# Patient Record
Sex: FEMALE | Race: WHITE | NOT HISPANIC OR LATINO | Employment: UNEMPLOYED | URBAN - METROPOLITAN AREA
[De-identification: names, ages, dates, MRNs, and addresses within clinical notes are randomized per-mention and may not be internally consistent; named-entity substitution may affect disease eponyms.]

---

## 2017-01-31 ENCOUNTER — TELEPHONE (OUTPATIENT)
Dept: VASCULAR LAB | Facility: MEDICAL CENTER | Age: 42
End: 2017-01-31

## 2017-01-31 DIAGNOSIS — Z86.718 HISTORY OF DVT (DEEP VEIN THROMBOSIS): ICD-10-CM

## 2017-01-31 RX ORDER — WARFARIN SODIUM 7.5 MG/1
TABLET ORAL
Qty: 45 TAB | Refills: 0 | Status: SHIPPED | OUTPATIENT
Start: 2017-01-31 | End: 2019-12-11

## 2017-02-07 ENCOUNTER — ANTICOAGULATION MONITORING (OUTPATIENT)
Dept: VASCULAR LAB | Facility: MEDICAL CENTER | Age: 42
End: 2017-02-07

## 2017-02-07 DIAGNOSIS — I82.401 ACUTE DEEP VEIN THROMBOSIS (DVT) OF RIGHT LOWER EXTREMITY, UNSPECIFIED VEIN (HCC): ICD-10-CM

## 2017-02-08 NOTE — PROGRESS NOTES
Discharged from [facility].  Secondary to non/adherence/ non compliance 8-2-2016  Jennifer Mcintosh, LESAD

## 2019-12-11 ENCOUNTER — APPOINTMENT (OUTPATIENT)
Dept: RADIOLOGY | Facility: MEDICAL CENTER | Age: 44
End: 2019-12-11
Attending: EMERGENCY MEDICINE
Payer: COMMERCIAL

## 2019-12-11 ENCOUNTER — HOSPITAL ENCOUNTER (EMERGENCY)
Facility: MEDICAL CENTER | Age: 44
End: 2019-12-11
Attending: EMERGENCY MEDICINE
Payer: COMMERCIAL

## 2019-12-11 VITALS
DIASTOLIC BLOOD PRESSURE: 85 MMHG | HEIGHT: 66 IN | HEART RATE: 91 BPM | WEIGHT: 127.65 LBS | TEMPERATURE: 98.3 F | RESPIRATION RATE: 18 BRPM | OXYGEN SATURATION: 100 % | BODY MASS INDEX: 20.51 KG/M2 | SYSTOLIC BLOOD PRESSURE: 136 MMHG

## 2019-12-11 DIAGNOSIS — R56.9 SEIZURE-LIKE ACTIVITY (HCC): ICD-10-CM

## 2019-12-11 LAB
BASOPHILS # BLD AUTO: 0.5 % (ref 0–1.8)
BASOPHILS # BLD: 0.04 K/UL (ref 0–0.12)
EOSINOPHIL # BLD AUTO: 0.04 K/UL (ref 0–0.51)
EOSINOPHIL NFR BLD: 0.5 % (ref 0–6.9)
ERYTHROCYTE [DISTWIDTH] IN BLOOD BY AUTOMATED COUNT: 45.9 FL (ref 35.9–50)
HCT VFR BLD AUTO: 41.2 % (ref 37–47)
HGB BLD-MCNC: 13.7 G/DL (ref 12–16)
IMM GRANULOCYTES # BLD AUTO: 0.03 K/UL (ref 0–0.11)
IMM GRANULOCYTES NFR BLD AUTO: 0.4 % (ref 0–0.9)
LYMPHOCYTES # BLD AUTO: 2.11 K/UL (ref 1–4.8)
LYMPHOCYTES NFR BLD: 26.2 % (ref 22–41)
MCH RBC QN AUTO: 34.4 PG (ref 27–33)
MCHC RBC AUTO-ENTMCNC: 33.3 G/DL (ref 33.6–35)
MCV RBC AUTO: 103.5 FL (ref 81.4–97.8)
MONOCYTES # BLD AUTO: 0.4 K/UL (ref 0–0.85)
MONOCYTES NFR BLD AUTO: 5 % (ref 0–13.4)
NEUTROPHILS # BLD AUTO: 5.42 K/UL (ref 2–7.15)
NEUTROPHILS NFR BLD: 67.4 % (ref 44–72)
NRBC # BLD AUTO: 0 K/UL
NRBC BLD-RTO: 0 /100 WBC
PLATELET # BLD AUTO: 165 K/UL (ref 164–446)
PMV BLD AUTO: 10.1 FL (ref 9–12.9)
RBC # BLD AUTO: 3.98 M/UL (ref 4.2–5.4)
WBC # BLD AUTO: 8 K/UL (ref 4.8–10.8)

## 2019-12-11 PROCEDURE — 99283 EMERGENCY DEPT VISIT LOW MDM: CPT

## 2019-12-11 PROCEDURE — 85025 COMPLETE CBC W/AUTO DIFF WBC: CPT

## 2019-12-11 PROCEDURE — 70450 CT HEAD/BRAIN W/O DYE: CPT

## 2019-12-11 PROCEDURE — 36415 COLL VENOUS BLD VENIPUNCTURE: CPT

## 2019-12-11 RX ORDER — LEVETIRACETAM 500 MG/1
500 TABLET ORAL 2 TIMES DAILY
Qty: 60 TAB | Refills: 1 | Status: SHIPPED | OUTPATIENT
Start: 2019-12-11 | End: 2021-10-18

## 2019-12-11 RX ORDER — ACETAMINOPHEN 325 MG/1
650 TABLET ORAL EVERY 4 HOURS PRN
COMMUNITY

## 2019-12-11 RX ORDER — IBUPROFEN 200 MG
800 TABLET ORAL EVERY 6 HOURS PRN
COMMUNITY

## 2019-12-11 NOTE — ED NOTES
She went out to her car while she was at work and doesn't remember what happened next. She woke up and vomited.

## 2019-12-11 NOTE — ED NOTES
Med rec updated and complete  Allergies reviewed  Interviewed pt with son at bedside with permission from pt.  Pt reports no prescription medications.  Pt reports no antibiotics in the last 2 weeks

## 2019-12-11 NOTE — ED NOTES
Lab called stating yellow and green tube hemolyzed.  Pt refusing another stick for a lab draw and IV will not draw as patient blood is thick, per patient she should take an anticoagulation medication however does not.  ERP updated.

## 2019-12-11 NOTE — ED PROVIDER NOTES
ED Provider Note    CHIEF COMPLAINT  Chief Complaint   Patient presents with   • Seizure     went out to car at work and can't remember what happenned  She vomited  Had another episode like this in the past       HPI  Kimmy Remy is a 44 y.o. female who presents for evaluation of potential seizure activity.  The patient reports that she was on her break at work when out to her car to smoke a cigarette.  She was apparently woke up in her car seat..  No clear tonic-clonic seizure activity was witnessed.  She did not bite her tongue but she did have some dried emesis around her mouth.  She reports some mild headache.  She reports apparently a history of potential seizure a year ago and was seen at [place] for this.  She does not drink alcohol.  No use of benzodiazepines.  No focal numbness tingling weakness to the arms legs or face.    REVIEW OF SYSTEMS  See HPI for further details.  No numbness tingling weakness fevers or chills all other systems are negative.     PAST MEDICAL HISTORY  Past Medical History:   Diagnosis Date   • Back pain     lower back pain   • Fibromyalgia    • Indigestion     occasional   • Infectious disease    • Other specified symptom associated with female genital organs     tubal ligation   • Personal history of venous thrombosis and embolism    • Renal disorder     acute after bladder infection 2011   • Unspecified hemorrhagic conditions     after birth and tubes ligated to fix       FAMILY HISTORY  Noncontributory    SOCIAL HISTORY  Social History     Socioeconomic History   • Marital status: Single     Spouse name: Not on file   • Number of children: Not on file   • Years of education: Not on file   • Highest education level: Not on file   Occupational History   • Not on file   Social Needs   • Financial resource strain: Not on file   • Food insecurity:     Worry: Not on file     Inability: Not on file   • Transportation needs:     Medical: Not on file     Non-medical: Not on  "file   Tobacco Use   • Smoking status: Current Some Day Smoker     Packs/day: 0.50     Years: 1.00     Pack years: 0.50     Types: Cigarettes     Start date: 11/12/2011   • Smokeless tobacco: Never Used   Substance and Sexual Activity   • Alcohol use: Yes     Comment: occassionally   • Drug use: No   • Sexual activity: Not on file   Lifestyle   • Physical activity:     Days per week: Not on file     Minutes per session: Not on file   • Stress: Not on file   Relationships   • Social connections:     Talks on phone: Not on file     Gets together: Not on file     Attends Jehov[employer]'s witness service: Not on file     Active member of club or organization: Not on file     Attends meetings of clubs or organizations: Not on file     Relationship status: Not on file   • Intimate partner violence:     Fear of current or ex partner: Not on file     Emotionally abused: Not on file     Physically abused: Not on file     Forced sexual activity: Not on file   Other Topics Concern   • Not on file   Social History Narrative   • Not on file       SURGICAL HISTORY  Past Surgical History:   Procedure Laterality Date   • GYN SURGERY  tubligation       CURRENT MEDICATIONS  Home Medications     Reviewed by Erik Nascimento (Pharmacy Tech) on 12/11/19 at 1249  Med List Status: Complete   Medication Last Dose Status   acetaminophen (TYLENOL) 325 MG Tab 12/11/2019 Active   ibuprofen (MOTRIN) 200 MG Tab 12/11/2019 Active   Prenatal Vit-Fe Fumarate-FA (PRENATAL PO) > 2 nights Active                ALLERGIES  Allergies   Allergen Reactions   • Cymbalta [Duloxetine Hcl]      seizure   • Keflex Rash       PHYSICAL EXAM  VITAL SIGNS: /83   Pulse 91   Temp 36.8 °C (98.3 °F) (Temporal)   Resp 16   Ht 1.676 m (5' 6\")   Wt 57.9 kg (127 lb 10.3 oz)   LMP 11/13/2019   SpO2 (!) 85%   BMI 20.60 kg/m²  Room air O2: 98    Constitutional: Well developed, Well nourished, No acute distress, Non-toxic appearance.   HENT: Normocephalic, Atraumatic, " Bilateral external ears normal, Oropharynx moist, No oral exudates, Nose normal.   Eyes: PERRLA, EOMI, Conjunctiva normal, No discharge.   Neck: Normal range of motion, No tenderness, Supple, No stridor.   Cardiovascular: Normal heart rate, Normal rhythm, No murmurs, No rubs, No gallops.   Thorax & Lungs: Normal breath sounds, No respiratory distress, No wheezing, No chest tenderness.   Abdomen: Bowel sounds normal, Soft, No tenderness, No masses, No pulsatile masses.   Skin: Warm, Dry, No erythema, No rash.   Back: No tenderness, No CVA tenderness.   Extremities: Intact distal pulses, No edema, No tenderness, No cyanosis, No clubbing.   Neurologic: Alert & oriented x 3, Normal motor function, Normal sensory function, No focal deficits noted.   Psychiatric: Affect normal, Judgment normal, Mood normal.     CT-HEAD W/O   Final Result      No CT evidence of acute infarct, hemorrhage or mass.        Results for orders placed or performed during the hospital encounter of 12/11/19   CBC WITH DIFFERENTIAL   Result Value Ref Range    WBC 8.0 4.8 - 10.8 K/uL    RBC 3.98 (L) 4.20 - 5.40 M/uL    Hemoglobin 13.7 12.0 - 16.0 g/dL    Hematocrit 41.2 37.0 - 47.0 %    .5 (H) 81.4 - 97.8 fL    MCH 34.4 (H) 27.0 - 33.0 pg    MCHC 33.3 (L) 33.6 - 35.0 g/dL    RDW 45.9 35.9 - 50.0 fL    Platelet Count 165 164 - 446 K/uL    MPV 10.1 9.0 - 12.9 fL    Neutrophils-Polys 67.40 44.00 - 72.00 %    Lymphocytes 26.20 22.00 - 41.00 %    Monocytes 5.00 0.00 - 13.40 %    Eosinophils 0.50 0.00 - 6.90 %    Basophils 0.50 0.00 - 1.80 %    Immature Granulocytes 0.40 0.00 - 0.90 %    Nucleated RBC 0.00 /100 WBC    Neutrophils (Absolute) 5.42 2.00 - 7.15 K/uL    Lymphs (Absolute) 2.11 1.00 - 4.80 K/uL    Monos (Absolute) 0.40 0.00 - 0.85 K/uL    Eos (Absolute) 0.04 0.00 - 0.51 K/uL    Baso (Absolute) 0.04 0.00 - 0.12 K/uL    Immature Granulocytes (abs) 0.03 0.00 - 0.11 K/uL    NRBC (Absolute) 0.00 K/uL      COURSE & MEDICAL DECISION  MAKING  Patient presents here with what appears to be possible seizure-like activity CT scan of the head is negative.  Patient apparently had her metabolic panel and prolactin levels clotted and she refused repeat blood draw.  Blood sugar was normal.  I counseled the patient that this could in fact be seizure.  I will start her on Keppra.  She has a PCP follow-up in 2 weeks.    FINAL IMPRESSION  1.  Possible seizure-like activity         Electronically signed by: Vik García, 12/11/2019 1:14 PM

## 2019-12-11 NOTE — ED NOTES
ERP at bedside. Pt agrees with plan of care discussed by ERP. AIDET acknowledged with patient. Demarco in low position, side rail up for pt safety. Call light within reach. Will continue to monitor.

## 2019-12-11 NOTE — ED NOTES
Discharge instructions provided.  Pt verbalized the understanding of discharge instructions to follow up with PCP and to return to ER if condition worsens.  Pt ambulated out of ER without difficulty.   RX 1       Eduction on Protein C deficiency, DVT and Keppra, Krames educational handouts provided.

## 2021-08-04 ENCOUNTER — TELEPHONE (OUTPATIENT)
Dept: SCHEDULING | Facility: IMAGING CENTER | Age: 46
End: 2021-08-04

## 2021-08-04 ENCOUNTER — PATIENT OUTREACH (OUTPATIENT)
Dept: SCHEDULING | Facility: IMAGING CENTER | Age: 46
End: 2021-08-04

## 2021-09-02 ENCOUNTER — OFFICE VISIT (OUTPATIENT)
Dept: MEDICAL GROUP | Facility: MEDICAL CENTER | Age: 46
End: 2021-09-02
Payer: COMMERCIAL

## 2021-09-02 VITALS
SYSTOLIC BLOOD PRESSURE: 110 MMHG | BODY MASS INDEX: 17.43 KG/M2 | HEART RATE: 79 BPM | RESPIRATION RATE: 18 BRPM | DIASTOLIC BLOOD PRESSURE: 62 MMHG | HEIGHT: 66 IN | OXYGEN SATURATION: 99 % | WEIGHT: 108.47 LBS | TEMPERATURE: 97.4 F

## 2021-09-02 DIAGNOSIS — Z72.0 TOBACCO USE: ICD-10-CM

## 2021-09-02 DIAGNOSIS — R63.4 WEIGHT LOSS: ICD-10-CM

## 2021-09-02 DIAGNOSIS — F41.9 ANXIETY AND DEPRESSION: ICD-10-CM

## 2021-09-02 DIAGNOSIS — M79.7 FIBROMYALGIA: ICD-10-CM

## 2021-09-02 DIAGNOSIS — I82.629 DEEP VEIN THROMBOSIS (DVT) OF OTHER VEIN OF UPPER EXTREMITY, UNSPECIFIED CHRONICITY, UNSPECIFIED LATERALITY (HCC): ICD-10-CM

## 2021-09-02 DIAGNOSIS — Z76.89 ENCOUNTER TO ESTABLISH CARE: ICD-10-CM

## 2021-09-02 DIAGNOSIS — G40.909 SEIZURE DISORDER (HCC): ICD-10-CM

## 2021-09-02 DIAGNOSIS — F32.A ANXIETY AND DEPRESSION: ICD-10-CM

## 2021-09-02 PROCEDURE — 99204 OFFICE O/P NEW MOD 45 MIN: CPT | Performed by: NURSE PRACTITIONER

## 2021-09-02 RX ORDER — APIXABAN 2.5 MG/1
TABLET, FILM COATED ORAL
COMMUNITY
Start: 2021-07-25 | End: 2021-12-15 | Stop reason: SDUPTHER

## 2021-09-02 RX ORDER — ZONISAMIDE 100 MG/1
CAPSULE ORAL
COMMUNITY
Start: 2021-07-25

## 2021-09-02 ASSESSMENT — PATIENT HEALTH QUESTIONNAIRE - PHQ9: CLINICAL INTERPRETATION OF PHQ2 SCORE: 0

## 2021-09-02 NOTE — ASSESSMENT & PLAN NOTE
She developed a DVT in the neck and extending down the arm on the left side after hospitalization with sepsis several years ago.  She is remained on Eliquis and is tolerating this without difficulty  unclear if she had a full work-up  Ultrasound:  Evidence of deep vein thrombosis extending from the left internal jugular vein through the subclavian vein, axillary vein and into the basilic vein.    Component      Latest Ref Rng & Units 6/9/2012 6/9/2012 6/9/2012 6/9/2012           3:30 PM  3:30 PM  3:30 PM  3:30 PM   Anti-Cariolipin Ab Igg      0 - 14 GPL   10    Anti-Cardiolipin Ab Igm      0 - 12 MPL   3    Anti-Cardiolipin Ab Iga      0 - 11 APL   2    Antithrombin III, Functional      76 - 128 % 110      Antithrombin III Ag Immuno      82 - 136 % 121      Protein S-Functional      57 - 131 %    94   Protein C Functional      77 - 173 %       V Leiden Factor             Homocysteine      <=10 umol/L  10       Component      Latest Ref Rng & Units 6/9/2012 6/9/2012           3:30 PM  3:30 PM   Anti-Cariolipin Ab Igg      0 - 14 GPL     Anti-Cardiolipin Ab Igm      0 - 12 MPL     Anti-Cardiolipin Ab Iga      0 - 11 APL     Antithrombin III, Functional      76 - 128 %     Antithrombin III Ag Immuno      82 - 136 %     Protein S-Functional      57 - 131 %     Protein C Functional      77 - 173 % 66 (L)    V Leiden Factor        Negative   Homocysteine      <=10 umol/L

## 2021-09-02 NOTE — ASSESSMENT & PLAN NOTE
She reports history of persistent anxiety as well as mild depression.  States that she constantly feels stressed.  She is not a relationship and not necessarily happy with this but denies any abuse.  She is not currently on any treatment for anxiety and depression, possible that this is contributing to her lack of appetite and weight loss

## 2021-09-02 NOTE — PROGRESS NOTES
Subjective:     Chief Complaint   Patient presents with   • Establish Care     weight loss     Kimmy Remy is a 46 y.o. female here to establish care.  She is currently working from home, lives with her youngest son and boyfriend.  Previously seen through [facility], did not feel that she was getting adequate care there.  She has several concerns today, we will need to bring her back for follow-up soon    Weight loss  Pt reports weight loss of about 10 pounds in the last year.  At one point her weight had been up to 190 several years ago.  She states that she left her  around that time and had a major change and lifestyle and diet and began to progressively lose weight.  At this point she is concerned that her weight is unhealthy.  She states that she is hungry but food does not taste good to her and she very rarely is able to finish her meal, sometimes skips meals.  She does have anxiety which is not currently treated.  States that she often feels stressed.  She uses marijuana daily  Denies any persistent reflux, nausea, or abdominal pain associated with eating.  No night sweats.  Last mammogram about a year ago, she is not yet had a colon cancer screening.  Denies changes in bowel movements, blood in stool.  Maternal grandmother had uterine cancer otherwise no family history that she is aware of  Reports rare alcohol use, denies any other illicit drugs    Tobacco use  Started in 2011, states that she has been smoking about 5 cigarettes or so daily.  Denies any shortness of breath, chronic cough    Seizure disorder (HCC)  Chronic issue followed by neurology, stable at this time on zonegran    Anxiety and depression  She reports history of persistent anxiety as well as mild depression.  States that she constantly feels stressed.  She is not a relationship and not necessarily happy with this but denies any abuse.  She is not currently on any treatment for anxiety and depression, possible that this is  contributing to her lack of appetite and weight loss    Fibromyalgia  Reports prior diagnosis of fibromyalgia, managing with Tylenol and ibuprofen as needed.  She was given trial of Cymbalta but this was around the time that she developed seizures    DVT (deep venous thrombosis)  She developed a DVT in the neck and extending down the arm on the left side after hospitalization with sepsis several years ago.  She is remained on Eliquis and is tolerating this without difficulty  unclear if she had a full work-up  Ultrasound:  Evidence of deep vein thrombosis extending from the left internal jugular vein through the subclavian vein, axillary vein and into the basilic vein.    Component      Latest Ref Rng & Units 6/9/2012 6/9/2012 6/9/2012 6/9/2012           3:30 PM  3:30 PM  3:30 PM  3:30 PM   Anti-Cariolipin Ab Igg      0 - 14 GPL   10    Anti-Cardiolipin Ab Igm      0 - 12 MPL   3    Anti-Cardiolipin Ab Iga      0 - 11 APL   2    Antithrombin III, Functional      76 - 128 % 110      Antithrombin III Ag Immuno      82 - 136 % 121      Protein S-Functional      57 - 131 %    94   Protein C Functional      77 - 173 %       V Leiden Factor             Homocysteine      <=10 umol/L  10       Component      Latest Ref Rng & Units 6/9/2012 6/9/2012           3:30 PM  3:30 PM   Anti-Cariolipin Ab Igg      0 - 14 GPL     Anti-Cardiolipin Ab Igm      0 - 12 MPL     Anti-Cardiolipin Ab Iga      0 - 11 APL     Antithrombin III, Functional      76 - 128 %     Antithrombin III Ag Immuno      82 - 136 %     Protein S-Functional      57 - 131 %     Protein C Functional      77 - 173 % 66 (L)    V Leiden Factor        Negative   Homocysteine      <=10 umol/L          Current medicines (including changes today)  Current Outpatient Medications   Medication Sig Dispense Refill   • zonisamide (ZONEGRAN) 100 MG Cap      • ELIQUIS 2.5 MG Tab      • ibuprofen (MOTRIN) 200 MG Tab Take 800 mg by mouth every 6 hours as needed for Mild Pain.    "  • acetaminophen (TYLENOL) 325 MG Tab Take 650 mg by mouth every four hours as needed for Mild Pain.     • Prenatal Vit-Fe Fumarate-FA (PRENATAL PO) Take 1 Tab by mouth every evening. (Patient not taking: Reported on 9/2/2021)     • levETIRAcetam (KEPPRA) 500 MG Tab Take 1 Tab by mouth 2 times a day. (Patient not taking: Reported on 9/2/2021) 60 Tab 1     No current facility-administered medications for this visit.     She  has a past medical history of Anxiety and depression (9/2/2021), Back pain, Fibromyalgia, Indigestion, Infectious disease, Other specified symptom associated with female genital organs, Personal history of venous thrombosis and embolism, Renal disorder, Seizure disorder (HCC) (9/2/2021), and Unspecified hemorrhagic conditions. She also has no past medical history of Arrhythmia, Arthritis, Asthma, Bronchitis, Cancer (HCC), Chronic airway obstruction, not elsewhere classified, Congestive heart failure (HCC), Encounter for renal dialysis, Fall, Glaucoma, Heart murmur, Heart valve disease, Hypertension, Jaundice, Myocardial infarct (HCC), Other and unspecified angina pectoris, Pacemaker, Pneumonia, Rheumatic fever, Stroke (HCC), Type II or unspecified type diabetes mellitus without mention of complication, not stated as uncontrolled, Unspecified cataract, Unspecified disorder of thyroid, or Unspecified urinary incontinence.    ROS included above     Objective:     /62 (BP Location: Right arm, Patient Position: Sitting, BP Cuff Size: Adult)   Pulse 79   Temp 36.3 °C (97.4 °F) (Temporal)   Resp 18   Ht 1.676 m (5' 6\")   Wt 49.2 kg (108 lb 7.5 oz)   SpO2 99%  Body mass index is 17.51 kg/m².     Physical Exam:  General: Alert, oriented in no acute distress.  Eye contact is good, speech is normal, affect calm  HEENT: Oral mucosa pink moist, no lesions. TMs gray with good landmarks bilaterally. No lymphadenopathy.  Lungs: clear to auscultation bilaterally, normal effort, no wheeze/ rhonchi/ " rales.  CV: regular rate and rhythm, S1, S2, no murmur  Abdomen: soft, nontender, no hepatosplenomegaly  Ext: no edema, color normal, vascularity normal, temperature normal    Assessment and Plan:   The following treatment plan was discussed   1. Weight loss   progressive weight loss over the last few years, down an additional 10 pounds or so in the last year.  She reports difficulty eating, not necessarily lack of appetite, but the food is not really appealing or does not taste good to her.  She does not have any complaints of abdominal pain, chronic nausea.  No night sweats.  We will evaluate labs, plan to update all cancer screenings  She is using marijuana daily, we discussed that this may be further contributing to her difficulty with eating.  Cessation encouraged  TSH    FREE THYROXINE    TRIIDOTHYRONINE    THYROID PEROXIDASE  (TPO) AB    CBC WITH DIFFERENTIAL    Comp Metabolic Panel    HEMOGLOBIN A1C   2. Tobacco use   she is interested in quitting but has not set a quit date.  We will discuss further at next visit   3. Seizure disorder (HCC)   stable on Zonegran.  This may be contributing to her anorexia   4. Anxiety and depression  Uncontrolled.  History of reaction to Cymbalta.  May benefit from mirtazapine which could also stimulate her appetite.  We will discuss further at next visit   5. Fibromyalgia   currently managing with Tylenol or ibuprofen as needed   6. Deep vein thrombosis (DVT) of other vein of upper extremity, unspecified chronicity, unspecified laterality (HCC)   extensive DVT in the right upper extremity and neck several years ago, she has remained on Eliquis   7. Encounter to establish care         Followup: 2 weeks for Pap smear and lab review       Please note that this dictation was created using voice recognition software. I have worked with consultants from the vendor as well as technical experts from [employer] to optimize the interface. I have made every reasonable attempt to  correct obvious errors, but I expect that there are errors of grammar and possibly content that I did not discover before finalizing the note.

## 2021-09-02 NOTE — ASSESSMENT & PLAN NOTE
Reports prior diagnosis of fibromyalgia, managing with Tylenol and ibuprofen as needed.  She was given trial of Cymbalta but this was around the time that she developed seizures

## 2021-09-02 NOTE — ASSESSMENT & PLAN NOTE
Pt reports weight loss of about 10 pounds in the last year.  At one point her weight had been up to 190 several years ago.  She states that she left her  around that time and had a major change and lifestyle and diet and began to progressively lose weight.  At this point she is concerned that her weight is unhealthy.  She states that she is hungry but food does not taste good to her and she very rarely is able to finish her meal, sometimes skips meals.  She does have anxiety which is not currently treated.  States that she often feels stressed.  She uses marijuana daily  Denies any persistent reflux, nausea, or abdominal pain associated with eating.  No night sweats.  Last mammogram about a year ago, she is not yet had a colon cancer screening.  Denies changes in bowel movements, blood in stool.  Maternal grandmother had uterine cancer otherwise no family history that she is aware of  Reports rare alcohol use, denies any other illicit drugs

## 2021-09-02 NOTE — ASSESSMENT & PLAN NOTE
Started in 2011, states that she has been smoking about 5 cigarettes or so daily.  Denies any shortness of breath, chronic cough

## 2021-09-08 ENCOUNTER — HOSPITAL ENCOUNTER (OUTPATIENT)
Dept: LAB | Facility: MEDICAL CENTER | Age: 46
End: 2021-09-08
Attending: NURSE PRACTITIONER
Payer: COMMERCIAL

## 2021-09-08 DIAGNOSIS — R63.4 WEIGHT LOSS: ICD-10-CM

## 2021-09-08 LAB
ALBUMIN SERPL BCP-MCNC: 4.5 G/DL (ref 3.2–4.9)
ALBUMIN/GLOB SERPL: 1.7 G/DL
ALP SERPL-CCNC: 52 U/L (ref 30–99)
ALT SERPL-CCNC: 18 U/L (ref 2–50)
ANION GAP SERPL CALC-SCNC: 13 MMOL/L (ref 7–16)
AST SERPL-CCNC: 18 U/L (ref 12–45)
BASOPHILS # BLD AUTO: 0.8 % (ref 0–1.8)
BASOPHILS # BLD: 0.05 K/UL (ref 0–0.12)
BILIRUB SERPL-MCNC: 0.2 MG/DL (ref 0.1–1.5)
BUN SERPL-MCNC: 24 MG/DL (ref 8–22)
CALCIUM SERPL-MCNC: 9.5 MG/DL (ref 8.5–10.5)
CHLORIDE SERPL-SCNC: 111 MMOL/L (ref 96–112)
CO2 SERPL-SCNC: 19 MMOL/L (ref 20–33)
CREAT SERPL-MCNC: 1.08 MG/DL (ref 0.5–1.4)
EOSINOPHIL # BLD AUTO: 0.14 K/UL (ref 0–0.51)
EOSINOPHIL NFR BLD: 2.1 % (ref 0–6.9)
ERYTHROCYTE [DISTWIDTH] IN BLOOD BY AUTOMATED COUNT: 50.4 FL (ref 35.9–50)
EST. AVERAGE GLUCOSE BLD GHB EST-MCNC: 94 MG/DL
FASTING STATUS PATIENT QL REPORTED: NORMAL
GLOBULIN SER CALC-MCNC: 2.6 G/DL (ref 1.9–3.5)
GLUCOSE SERPL-MCNC: 97 MG/DL (ref 65–99)
HBA1C MFR BLD: 4.9 % (ref 4–5.6)
HCT VFR BLD AUTO: 39.6 % (ref 37–47)
HGB BLD-MCNC: 12.8 G/DL (ref 12–16)
IMM GRANULOCYTES # BLD AUTO: 0.02 K/UL (ref 0–0.11)
IMM GRANULOCYTES NFR BLD AUTO: 0.3 % (ref 0–0.9)
LYMPHOCYTES # BLD AUTO: 2.09 K/UL (ref 1–4.8)
LYMPHOCYTES NFR BLD: 31.4 % (ref 22–41)
MCH RBC QN AUTO: 35 PG (ref 27–33)
MCHC RBC AUTO-ENTMCNC: 32.3 G/DL (ref 33.6–35)
MCV RBC AUTO: 108.2 FL (ref 81.4–97.8)
MONOCYTES # BLD AUTO: 0.42 K/UL (ref 0–0.85)
MONOCYTES NFR BLD AUTO: 6.3 % (ref 0–13.4)
NEUTROPHILS # BLD AUTO: 3.94 K/UL (ref 2–7.15)
NEUTROPHILS NFR BLD: 59.1 % (ref 44–72)
NRBC # BLD AUTO: 0 K/UL
NRBC BLD-RTO: 0 /100 WBC
PLATELET # BLD AUTO: 168 K/UL (ref 164–446)
PMV BLD AUTO: 10.5 FL (ref 9–12.9)
POTASSIUM SERPL-SCNC: 4.8 MMOL/L (ref 3.6–5.5)
PROT SERPL-MCNC: 7.1 G/DL (ref 6–8.2)
RBC # BLD AUTO: 3.66 M/UL (ref 4.2–5.4)
SODIUM SERPL-SCNC: 143 MMOL/L (ref 135–145)
T3 SERPL-MCNC: 65.8 NG/DL (ref 60–181)
T4 FREE SERPL-MCNC: 1.02 NG/DL (ref 0.93–1.7)
THYROPEROXIDASE AB SERPL-ACNC: <9 IU/ML (ref 0–9)
TSH SERPL DL<=0.005 MIU/L-ACNC: 1.34 UIU/ML (ref 0.38–5.33)
WBC # BLD AUTO: 6.7 K/UL (ref 4.8–10.8)

## 2021-09-08 PROCEDURE — 83036 HEMOGLOBIN GLYCOSYLATED A1C: CPT

## 2021-09-08 PROCEDURE — 80053 COMPREHEN METABOLIC PANEL: CPT

## 2021-09-08 PROCEDURE — 84480 ASSAY TRIIODOTHYRONINE (T3): CPT

## 2021-09-08 PROCEDURE — 84443 ASSAY THYROID STIM HORMONE: CPT

## 2021-09-08 PROCEDURE — 86376 MICROSOMAL ANTIBODY EACH: CPT

## 2021-09-08 PROCEDURE — 85025 COMPLETE CBC W/AUTO DIFF WBC: CPT

## 2021-09-08 PROCEDURE — 84439 ASSAY OF FREE THYROXINE: CPT

## 2021-09-08 PROCEDURE — 36415 COLL VENOUS BLD VENIPUNCTURE: CPT

## 2021-09-16 ENCOUNTER — OFFICE VISIT (OUTPATIENT)
Dept: MEDICAL GROUP | Facility: MEDICAL CENTER | Age: 46
End: 2021-09-16
Payer: COMMERCIAL

## 2021-09-16 ENCOUNTER — HOSPITAL ENCOUNTER (OUTPATIENT)
Facility: MEDICAL CENTER | Age: 46
End: 2021-09-16
Attending: NURSE PRACTITIONER
Payer: COMMERCIAL

## 2021-09-16 VITALS
WEIGHT: 107.58 LBS | HEART RATE: 72 BPM | HEIGHT: 66 IN | TEMPERATURE: 97.9 F | DIASTOLIC BLOOD PRESSURE: 66 MMHG | BODY MASS INDEX: 17.29 KG/M2 | OXYGEN SATURATION: 99 % | SYSTOLIC BLOOD PRESSURE: 106 MMHG | RESPIRATION RATE: 18 BRPM

## 2021-09-16 DIAGNOSIS — F41.9 ANXIETY AND DEPRESSION: ICD-10-CM

## 2021-09-16 DIAGNOSIS — Z01.411 ENCNTR FOR GYN EXAM (GENERAL) (ROUTINE) W ABNORMAL FINDINGS: ICD-10-CM

## 2021-09-16 DIAGNOSIS — F32.A ANXIETY AND DEPRESSION: ICD-10-CM

## 2021-09-16 DIAGNOSIS — G40.909 SEIZURE DISORDER (HCC): ICD-10-CM

## 2021-09-16 DIAGNOSIS — Z12.31 ENCOUNTER FOR SCREENING MAMMOGRAM FOR BREAST CANCER: ICD-10-CM

## 2021-09-16 DIAGNOSIS — D75.89 MACROCYTOSIS WITHOUT ANEMIA: ICD-10-CM

## 2021-09-16 PROCEDURE — 88175 CYTOPATH C/V AUTO FLUID REDO: CPT

## 2021-09-16 PROCEDURE — 99396 PREV VISIT EST AGE 40-64: CPT | Performed by: NURSE PRACTITIONER

## 2021-09-16 PROCEDURE — 99000 SPECIMEN HANDLING OFFICE-LAB: CPT | Performed by: NURSE PRACTITIONER

## 2021-09-16 PROCEDURE — 99214 OFFICE O/P EST MOD 30 MIN: CPT | Mod: 25 | Performed by: NURSE PRACTITIONER

## 2021-09-16 PROCEDURE — 87624 HPV HI-RISK TYP POOLED RSLT: CPT

## 2021-09-16 RX ORDER — MIRTAZAPINE 15 MG/1
15 TABLET, FILM COATED ORAL
Qty: 30 TABLET | Refills: 2 | Status: SHIPPED | OUTPATIENT
Start: 2021-09-16

## 2021-09-16 ASSESSMENT — PATIENT HEALTH QUESTIONNAIRE - PHQ9
CLINICAL INTERPRETATION OF PHQ2 SCORE: 1
SUM OF ALL RESPONSES TO PHQ QUESTIONS 1-9: 9
5. POOR APPETITE OR OVEREATING: 2 - MORE THAN HALF THE DAYS

## 2021-09-16 ASSESSMENT — ANXIETY QUESTIONNAIRES
6. BECOMING EASILY ANNOYED OR IRRITABLE: MORE THAN HALF THE DAYS
GAD7 TOTAL SCORE: 6
1. FEELING NERVOUS, ANXIOUS, OR ON EDGE: MORE THAN HALF THE DAYS
4. TROUBLE RELAXING: SEVERAL DAYS
5. BEING SO RESTLESS THAT IT IS HARD TO SIT STILL: NOT AT ALL
7. FEELING AFRAID AS IF SOMETHING AWFUL MIGHT HAPPEN: NOT AT ALL
2. NOT BEING ABLE TO STOP OR CONTROL WORRYING: NOT AT ALL
3. WORRYING TOO MUCH ABOUT DIFFERENT THINGS: SEVERAL DAYS
IF YOU CHECKED OFF ANY PROBLEMS ON THIS QUESTIONNAIRE, HOW DIFFICULT HAVE THESE PROBLEMS MADE IT FOR YOU TO DO YOUR WORK, TAKE CARE OF THINGS AT HOME, OR GET ALONG WITH OTHER PEOPLE: SOMEWHAT DIFFICULT

## 2021-09-16 ASSESSMENT — FIBROSIS 4 INDEX: FIB4 SCORE: 1.19

## 2021-09-16 NOTE — PROGRESS NOTES
CC:  Pap/Well Woman Exam    History of present illness:  Kimmy Remy is 46 y.o.  female presenting today for well woman exam with gynecological exam and Pap smear.  Pap several years ago, normal.  She is in a monogamous relationship, has had a tubal ligation.  Due for mammogram    Seizure disorder (HCC)  Chronic issue followed by Dr. Pineda.  She has been on Zonegran which could be impacting her appetite, anorexia listed as side effect and 13% of patients    Macrocytosis without anemia  Recent labs reviewed.  We will obtain a B12 level.  She does not consume any substantial alcohol    Anxiety and depression  This is been an ongoing issue for some time.  She has had multiple family stressors, son has been very depressed which weighs on her emotionally.  She is not very happy in her current relationship.  We did discuss this on 2021.  She tells me that she had been given a trial of Cymbalta several years ago, that was around the time that she was diagnosed with seizure disorder so they were not certain if this was a side effect.  Has not tried any SSRIs.  She has had chronic difficulty with lack of appetite and progressive weight loss and may be partially removed as well.  She is not sleeping well, wakes up every night around 2 AM  No SI/HI, no history of manic behavior      Past Medical History:   Diagnosis Date   • Anxiety and depression 2021   • Back pain     lower back pain   • Fibromyalgia    • Indigestion     occasional   • Infectious disease    • Other specified symptom associated with female genital organs     tubal ligation   • Personal history of venous thrombosis and embolism    • Renal disorder     acute after bladder infection    • Seizure disorder (HCC) 2021   • Unspecified hemorrhagic conditions     after birth and tubes ligated to fix       Past Surgical History:   Procedure Laterality Date   • GYN SURGERY  tubligation   • TUBAL COAGULATION LAPAROSCOPIC BILATERAL          Outpatient Encounter Medications as of 9/16/2021   Medication Sig Dispense Refill   • mirtazapine (REMERON) 15 MG Tab Take 1 Tablet by mouth at bedtime. 30 Tablet 2   • zonisamide (ZONEGRAN) 100 MG Cap      • ELIQUIS 2.5 MG Tab      • ibuprofen (MOTRIN) 200 MG Tab Take 800 mg by mouth every 6 hours as needed for Mild Pain.     • acetaminophen (TYLENOL) 325 MG Tab Take 650 mg by mouth every four hours as needed for Mild Pain.     • Prenatal Vit-Fe Fumarate-FA (PRENATAL PO) Take 1 Tab by mouth every evening. (Patient not taking: Reported on 9/2/2021)     • levETIRAcetam (KEPPRA) 500 MG Tab Take 1 Tab by mouth 2 times a day. (Patient not taking: Reported on 9/2/2021) 60 Tab 1     No facility-administered encounter medications on file as of 9/16/2021.       Patient Active Problem List    Diagnosis Date Noted   • Weight loss 09/02/2021   • Tobacco use 09/02/2021   • Seizure disorder (HCC) 09/02/2021   • Anxiety and depression 09/02/2021   • Fibromyalgia 09/02/2021   • Myalgia 08/12/2014   • Macrocytosis 08/12/2014   • Lower urinary tract infectious disease 08/12/2014   • Anticoagulated 08/12/2014   • Supratherapeutic INR 08/12/2014   • History of DVT (deep vein thrombosis) 08/12/2014   • DVT (deep venous thrombosis) ([facility]) 06/10/2012   • Neuropathy 06/10/2012   • Migraine 11/15/2011   • Hypokalemia 11/15/2011   • Macrocytosis without anemia 11/15/2011       .  Social History     Socioeconomic History   • Marital status: Single     Spouse name: Not on file   • Number of children: Not on file   • Years of education: Not on file   • Highest education level: Not on file   Occupational History   • Not on file   Tobacco Use   • Smoking status: Current Some Day Smoker     Packs/day: 0.50     Years: 1.00     Pack years: 0.50     Types: Cigarettes     Start date: 11/12/2011   • Smokeless tobacco: Never Used   Substance and Sexual Activity   • Alcohol use: Yes     Comment: occassionally   • Drug use: Yes     Types: Marijuana  "    Comment: daily    • Sexual activity: Not on file   Other Topics Concern   • Not on file   Social History Narrative   • Not on file     Social Determinants of Health     Financial Resource Strain:    • Difficulty of Paying Living Expenses:    Food Insecurity:    • Worried About Running Out of Food in the Last Year:    • Ran Out of Food in the Last Year:    Transportation Needs:    • Lack of Transportation (Medical):    • Lack of Transportation (Non-Medical):    Physical Activity:    • Days of Exercise per Week:    • Minutes of Exercise per Session:    Stress:    • Feeling of Stress :    Social Connections:    • Frequency of Communication with Friends and Family:    • Frequency of Social Gatherings with Friends and Family:    • Attends Alevism Services:    • Active Member of Clubs or Organizations:    • Attends Club or Organization Meetings:    • Marital Status:    Intimate Partner Violence:    • Fear of Current or Ex-Partner:    • Emotionally Abused:    • Physically Abused:    • Sexually Abused:        Family History   Problem Relation Age of Onset   • No Known Problems Father    • Cancer Maternal Grandmother         uterine         ROS:  Denies chest pain, SOB, bowel or bladder changes. No significant dysmenorrhea, concerning vaginal discharge or irritation, no dyspareunia or postcoital bleeding. Denies h/o migraine with aura. Denies musculoskeletal, neurological, or psychiatric problems.      /66 (BP Location: Right arm, Patient Position: Sitting, BP Cuff Size: Adult)   Pulse 72   Temp 36.6 °C (97.9 °F)   Resp 18   Ht 1.676 m (5' 6\")   Wt 48.8 kg (107 lb 9.4 oz)   SpO2 99%   BMI 17.36 kg/m²     GEN:  Appears well and in no apparent distress   NECK:  Supple without adenopathy or thyromegaly  LUNGS:  Clear and equal. No wheeze, ronchi, or rales.  CV:  RRR, S1, S2. No murmur.  Pedal pulses 2+ bilaterally.  BREAST:  Symmetrical without masses. No nipple discharge.  ABD:  Soft, non-tender, " non-distended, normal bowel sounds.  No hepatosplenomegaly.  :  Normal external female genitalia.  Vaginal canal clear.  Cervix appears normal. Specimen collected from transformation zone. Bimanual exam:  No CMT, normal size uterus without masses or tenderness; no adnexal masses or tenderness.      Assessment and plan    1. Encntr for gyn exam (general) (routine) w abnormal findings  Normal GYN exam.  Pap sent, follow-up pending result.  Mammogram ordered  - THINPREP PAP WITH HPV; Future    2. Encounter for screening mammogram for breast cancer  - MA-SCREENING MAMMO BILAT W/TOMOSYNTHESIS W/CAD; Future    3. Anxiety and depression  Ongoing issue, possible history of side effects with Cymbalta.  She is interested in further treatment options.  Because of her weight loss, loss of appetite, we will go with mirtazapine.  I expect this will help with her sleep, mood, as well as appetite.  Risks and side effects reviewed.  We will follow up on this in 1 month  - mirtazapine (REMERON) 15 MG Tab; Take 1 Tablet by mouth at bedtime.  Dispense: 30 Tablet; Refill: 2    4. Seizure disorder (HCC)  Stable on zonegran but possibly contributing to lack of appetite.  Encouraged to schedule follow-up with her neurologist    5. Macrocytosis without anemia  Further labs  - VITAMIN B12; Future      F/u pending results

## 2021-09-16 NOTE — ASSESSMENT & PLAN NOTE
Chronic issue followed by Dr. Pineda.  She has been on Zonegran which could be impacting her appetite, anorexia listed as side effect and 13% of patients

## 2021-09-16 NOTE — ASSESSMENT & PLAN NOTE
This is been an ongoing issue for some time.  She has had multiple family stressors, son has been very depressed which weighs on her emotionally.  She is not very happy in her current relationship.  We did discuss this on 09/02/2021.  She tells me that she had been given a trial of Cymbalta several years ago, that was around the time that she was diagnosed with seizure disorder so they were not certain if this was a side effect.  Has not tried any SSRIs.  She has had chronic difficulty with lack of appetite and progressive weight loss and may be partially removed as well.  She is not sleeping well, wakes up every night around 2 AM  No SI/HI, no history of manic behavior

## 2021-09-17 LAB
CYTOLOGY REG CYTOL: ABNORMAL
HPV HR 12 DNA CVX QL NAA+PROBE: POSITIVE
HPV16 DNA SPEC QL NAA+PROBE: NEGATIVE
HPV18 DNA SPEC QL NAA+PROBE: NEGATIVE
SPECIMEN SOURCE: ABNORMAL

## 2021-09-28 ENCOUNTER — HOSPITAL ENCOUNTER (OUTPATIENT)
Dept: RADIOLOGY | Facility: MEDICAL CENTER | Age: 46
End: 2021-09-28
Payer: COMMERCIAL

## 2021-10-18 ENCOUNTER — OFFICE VISIT (OUTPATIENT)
Dept: MEDICAL GROUP | Facility: MEDICAL CENTER | Age: 46
End: 2021-10-18
Payer: COMMERCIAL

## 2021-10-18 VITALS
BODY MASS INDEX: 17.57 KG/M2 | SYSTOLIC BLOOD PRESSURE: 98 MMHG | OXYGEN SATURATION: 99 % | RESPIRATION RATE: 18 BRPM | HEIGHT: 66 IN | HEART RATE: 87 BPM | DIASTOLIC BLOOD PRESSURE: 52 MMHG | TEMPERATURE: 97.7 F | WEIGHT: 109.35 LBS

## 2021-10-18 DIAGNOSIS — D75.89 MACROCYTOSIS WITHOUT ANEMIA: ICD-10-CM

## 2021-10-18 DIAGNOSIS — G40.909 SEIZURE DISORDER (HCC): ICD-10-CM

## 2021-10-18 DIAGNOSIS — F41.9 ANXIETY AND DEPRESSION: ICD-10-CM

## 2021-10-18 DIAGNOSIS — F32.A ANXIETY AND DEPRESSION: ICD-10-CM

## 2021-10-18 PROCEDURE — 99213 OFFICE O/P EST LOW 20 MIN: CPT | Performed by: NURSE PRACTITIONER

## 2021-10-18 ASSESSMENT — ANXIETY QUESTIONNAIRES
3. WORRYING TOO MUCH ABOUT DIFFERENT THINGS: SEVERAL DAYS
6. BECOMING EASILY ANNOYED OR IRRITABLE: SEVERAL DAYS
5. BEING SO RESTLESS THAT IT IS HARD TO SIT STILL: NOT AT ALL
1. FEELING NERVOUS, ANXIOUS, OR ON EDGE: SEVERAL DAYS
GAD7 TOTAL SCORE: 4
4. TROUBLE RELAXING: NOT AT ALL
2. NOT BEING ABLE TO STOP OR CONTROL WORRYING: SEVERAL DAYS
7. FEELING AFRAID AS IF SOMETHING AWFUL MIGHT HAPPEN: NOT AT ALL

## 2021-10-18 ASSESSMENT — PATIENT HEALTH QUESTIONNAIRE - PHQ9
5. POOR APPETITE OR OVEREATING: 0 - NOT AT ALL
CLINICAL INTERPRETATION OF PHQ2 SCORE: 1
SUM OF ALL RESPONSES TO PHQ QUESTIONS 1-9: 2

## 2021-10-18 ASSESSMENT — FIBROSIS 4 INDEX: FIB4 SCORE: 1.19

## 2021-10-18 NOTE — ASSESSMENT & PLAN NOTE
Last labs as noted below.  I did order a B12 level to be checked, she will return to the lab today for this.  No alcohol use  Component      Latest Ref Rng & Units 9/8/2021           6:48 AM   WBC      4.8 - 10.8 K/uL 6.7   RBC      4.20 - 5.40 M/uL 3.66 (L)   Hemoglobin      12.0 - 16.0 g/dL 12.8   Hematocrit      37.0 - 47.0 % 39.6   MCV      81.4 - 97.8 fL 108.2 (H)   MCH      27.0 - 33.0 pg 35.0 (H)   MCHC      33.6 - 35.0 g/dL 32.3 (L)   RDW      35.9 - 50.0 fL 50.4 (H)   Platelet Count      164 - 446 K/uL 168   MPV      9.0 - 12.9 fL 10.5   Neutrophils-Polys      44.00 - 72.00 % 59.10   Lymphocytes      22.00 - 41.00 % 31.40   Monocytes      0.00 - 13.40 % 6.30   Eosinophils      0.00 - 6.90 % 2.10   Basophils      0.00 - 1.80 % 0.80   Immature Granulocytes      0.00 - 0.90 % 0.30   Nucleated RBC      /100 WBC 0.00   Neutrophils (Absolute)      2.00 - 7.15 K/uL 3.94   Lymphs (Absolute)      1.00 - 4.80 K/uL 2.09   Monos (Absolute)      0.00 - 0.85 K/uL 0.42   Eos (Absolute)      0.00 - 0.51 K/uL 0.14   Baso (Absolute)      0.00 - 0.12 K/uL 0.05   Immature Granulocytes (abs)      0.00 - 0.11 K/uL 0.02   NRBC (Absolute)      K/uL 0.00

## 2021-10-18 NOTE — ASSESSMENT & PLAN NOTE
She continues to be seizure-free on Zonegran, followed by Dr. Pineda.  I do suspect that her loss of appetite over the last few months was medication related but this seems to have improved with start of Remeron

## 2021-10-18 NOTE — PROGRESS NOTES
"Subjective:     Chief Complaint   Patient presents with   • Follow-Up     Kimmy Ashlyn Remy is a 46 y.o. female here today to follow up on:    Anxiety and depression  For detailed history please see note dated 9/16/2021  Here today to follow-up on new prescription for Remeron 15 mg nightly which was started both for mood, appetite, and sleep difficulty.  She reports that this is working extremely well, states \"I love it\".  She is sleeping much better, sometimes gets up to use the restroom but then is able to go right back to sleep where she used to lay awake for hours at a time.  Her appetite has increased, she is not skipping any meals.  Overall her mood has improved and she is coping better with stress  She is satisfied with current dose and would like to continue with this  No SI/HI, history of manic behavior, no substance abuse  Depression Screening    Little interest or pleasure in doing things?  0 - not at all   Feeling down, depressed , or hopeless? 1 - several days   Trouble falling or staying asleep, or sleeping too much?  0 - not at all   Feeling tired or having little energy?  1 - several days   Poor appetite or overeating?  0 - not at all   Feeling bad about yourself - or that you are a failure or have let yourself or your family down? 0 - not at all   Trouble concentrating on things, such as reading the newspaper or watching television? 0 - not at all   Moving or speaking so slowly that other people could have noticed.  Or the opposite - being so fidgety or restless that you have been moving around a lot more than usual?  0 - not at all   Thoughts that you would be better off dead, or of hurting yourself?  0 - not at all   Patient Health Questionnaire Score: 2       If depressive symptoms identified deferred to follow up visit unless specifically addressed in assesment and plan.    Interpretation of PHQ-9 Total Score   Score Severity   1-4 No Depression   5-9 Mild Depression   10-14 Moderate " Depression   15-19 Moderately Severe Depression   20-27 Severe Depression    MARGARITA-7 Questionnaire    Feeling nervous, anxious, or on edge: Several days  Not being able to sop or control worrying: Several days  Worrying too much about different things: Several days  Trouble relaxing: Not at all  Being so restless that it's hard to sit still: Not at all  Becoming easily annoyed or irritable: Several days  Feeling afraid as if something awful might happen: Not at all  Total: 4    Interpretation of MARGARITA 7 Total Score   Score Severity :  0-4 No Anxiety   5-9 Mild Anxiety  10-14 Moderate Anxiety  15-21 Severe Anxiety        Macrocytosis without anemia  Last labs as noted below.  I did order a B12 level to be checked, she will return to the lab today for this.  No alcohol use  Component      Latest Ref Rng & Units 9/8/2021           6:48 AM   WBC      4.8 - 10.8 K/uL 6.7   RBC      4.20 - 5.40 M/uL 3.66 (L)   Hemoglobin      12.0 - 16.0 g/dL 12.8   Hematocrit      37.0 - 47.0 % 39.6   MCV      81.4 - 97.8 fL 108.2 (H)   MCH      27.0 - 33.0 pg 35.0 (H)   MCHC      33.6 - 35.0 g/dL 32.3 (L)   RDW      35.9 - 50.0 fL 50.4 (H)   Platelet Count      164 - 446 K/uL 168   MPV      9.0 - 12.9 fL 10.5   Neutrophils-Polys      44.00 - 72.00 % 59.10   Lymphocytes      22.00 - 41.00 % 31.40   Monocytes      0.00 - 13.40 % 6.30   Eosinophils      0.00 - 6.90 % 2.10   Basophils      0.00 - 1.80 % 0.80   Immature Granulocytes      0.00 - 0.90 % 0.30   Nucleated RBC      /100 WBC 0.00   Neutrophils (Absolute)      2.00 - 7.15 K/uL 3.94   Lymphs (Absolute)      1.00 - 4.80 K/uL 2.09   Monos (Absolute)      0.00 - 0.85 K/uL 0.42   Eos (Absolute)      0.00 - 0.51 K/uL 0.14   Baso (Absolute)      0.00 - 0.12 K/uL 0.05   Immature Granulocytes (abs)      0.00 - 0.11 K/uL 0.02   NRBC (Absolute)      K/uL 0.00       Seizure disorder (HCC)  She continues to be seizure-free on Zonegran, followed by Dr. Pineda.  I do suspect that her loss of appetite  "over the last few months was medication related but this seems to have improved with start of Remeron       Current medicines (including changes today)  Current Outpatient Medications   Medication Sig Dispense Refill   • mirtazapine (REMERON) 15 MG Tab Take 1 Tablet by mouth at bedtime. 30 Tablet 2   • zonisamide (ZONEGRAN) 100 MG Cap      • ELIQUIS 2.5 MG Tab      • ibuprofen (MOTRIN) 200 MG Tab Take 800 mg by mouth every 6 hours as needed for Mild Pain.     • acetaminophen (TYLENOL) 325 MG Tab Take 650 mg by mouth every four hours as needed for Mild Pain.       No current facility-administered medications for this visit.     She  has a past medical history of Anxiety and depression (9/2/2021), Back pain, Fibromyalgia, Indigestion, Infectious disease, Other specified symptom associated with female genital organs, Personal history of venous thrombosis and embolism, Renal disorder, Seizure disorder (HCC) (9/2/2021), and Unspecified hemorrhagic conditions. She also has no past medical history of Arrhythmia, Arthritis, Asthma, Bronchitis, Cancer (HCC), Chronic airway obstruction, not elsewhere classified, Congestive heart failure (HCC), Encounter for renal dialysis, Fall, Glaucoma, Heart murmur, Heart valve disease, Hypertension, Jaundice, Myocardial infarct (HCC), Other and unspecified angina pectoris, Pacemaker, Pneumonia, Rheumatic fever, Stroke (HCC), Type II or unspecified type diabetes mellitus without mention of complication, not stated as uncontrolled, Unspecified cataract, Unspecified disorder of thyroid, or Unspecified urinary incontinence.    ROS included above     Objective:     BP (!) 98/52 (BP Location: Right arm, Patient Position: Sitting, BP Cuff Size: Adult)   Pulse 87   Temp 36.5 °C (97.7 °F) (Temporal)   Resp 18   Ht 1.676 m (5' 6\")   Wt 49.6 kg (109 lb 5.6 oz)   SpO2 99%  Body mass index is 17.65 kg/m².     Physical Exam:  General: Alert, oriented in no acute distress.  Eye contact is good, " speech is normal, affect calm  Lungs: clear to auscultation bilaterally, normal effort, no wheeze/ rhonchi/ rales.  CV: regular rate and rhythm, S1, S2, no murmur  Ext: no edema, color normal, vascularity normal, temperature normal    Assessment and Plan:   The following treatment plan was discussed   1. Anxiety and depression   overall she is feeling much better with Remeron 15 mg nightly.  Appetite has improved, she is sleeping well.  We will continue with this   2. Macrocytosis without anemia   patient to complete additional labs as ordered   3. Seizure disorder (HCC)   stable       Followup pending labs and 2 months         Please note that this dictation was created using voice recognition software. I have worked with consultants from the vendor as well as technical experts from [facility] [employer] to optimize the interface. I have made every reasonable attempt to correct obvious errors, but I expect that there are errors of grammar and possibly content that I did not discover before finalizing the note.

## 2021-10-18 NOTE — ASSESSMENT & PLAN NOTE
"For detailed history please see note dated 9/16/2021  Here today to follow-up on new prescription for Remeron 15 mg nightly which was started both for mood, appetite, and sleep difficulty.  She reports that this is working extremely well, states \"I love it\".  She is sleeping much better, sometimes gets up to use the restroom but then is able to go right back to sleep where she used to lay awake for hours at a time.  Her appetite has increased, she is not skipping any meals.  Overall her mood has improved and she is coping better with stress  She is satisfied with current dose and would like to continue with this  No SI/HI, history of manic behavior, no substance abuse  Depression Screening    Little interest or pleasure in doing things?  0 - not at all   Feeling down, depressed , or hopeless? 1 - several days   Trouble falling or staying asleep, or sleeping too much?  0 - not at all   Feeling tired or having little energy?  1 - several days   Poor appetite or overeating?  0 - not at all   Feeling bad about yourself - or that you are a failure or have let yourself or your family down? 0 - not at all   Trouble concentrating on things, such as reading the newspaper or watching television? 0 - not at all   Moving or speaking so slowly that other people could have noticed.  Or the opposite - being so fidgety or restless that you have been moving around a lot more than usual?  0 - not at all   Thoughts that you would be better off dead, or of hurting yourself?  0 - not at all   Patient Health Questionnaire Score: 2       If depressive symptoms identified deferred to follow up visit unless specifically addressed in assesment and plan.    Interpretation of PHQ-9 Total Score   Score Severity   1-4 No Depression   5-9 Mild Depression   10-14 Moderate Depression   15-19 Moderately Severe Depression   20-27 Severe Depression    MARGARITA-7 Questionnaire    Feeling nervous, anxious, or on edge: Several days  Not being able to sop or " control worrying: Several days  Worrying too much about different things: Several days  Trouble relaxing: Not at all  Being so restless that it's hard to sit still: Not at all  Becoming easily annoyed or irritable: Several days  Feeling afraid as if something awful might happen: Not at all  Total: 4    Interpretation of MARGARITA 7 Total Score   Score Severity :  0-4 No Anxiety   5-9 Mild Anxiety  10-14 Moderate Anxiety  15-21 Severe Anxiety

## 2021-10-19 ENCOUNTER — PATIENT MESSAGE (OUTPATIENT)
Dept: MEDICAL GROUP | Facility: MEDICAL CENTER | Age: 46
End: 2021-10-19

## 2021-10-22 ENCOUNTER — HOSPITAL ENCOUNTER (OUTPATIENT)
Dept: LAB | Facility: MEDICAL CENTER | Age: 46
End: 2021-10-22
Attending: NURSE PRACTITIONER
Payer: COMMERCIAL

## 2021-10-22 DIAGNOSIS — D75.89 MACROCYTOSIS WITHOUT ANEMIA: ICD-10-CM

## 2021-10-22 LAB — VIT B12 SERPL-MCNC: 1301 PG/ML (ref 211–911)

## 2021-10-22 PROCEDURE — 36415 COLL VENOUS BLD VENIPUNCTURE: CPT

## 2021-10-22 PROCEDURE — 82607 VITAMIN B-12: CPT

## 2021-10-27 ENCOUNTER — HOSPITAL ENCOUNTER (OUTPATIENT)
Dept: RADIOLOGY | Facility: MEDICAL CENTER | Age: 46
End: 2021-10-27
Attending: NURSE PRACTITIONER
Payer: COMMERCIAL

## 2021-10-27 DIAGNOSIS — Z12.31 ENCOUNTER FOR SCREENING MAMMOGRAM FOR BREAST CANCER: ICD-10-CM

## 2021-10-27 PROCEDURE — 77063 BREAST TOMOSYNTHESIS BI: CPT

## 2021-12-15 ENCOUNTER — OFFICE VISIT (OUTPATIENT)
Dept: MEDICAL GROUP | Facility: MEDICAL CENTER | Age: 46
End: 2021-12-15
Payer: COMMERCIAL

## 2021-12-15 VITALS
OXYGEN SATURATION: 98 % | DIASTOLIC BLOOD PRESSURE: 82 MMHG | WEIGHT: 108.69 LBS | TEMPERATURE: 97.3 F | HEART RATE: 91 BPM | HEIGHT: 66 IN | RESPIRATION RATE: 18 BRPM | BODY MASS INDEX: 17.47 KG/M2 | SYSTOLIC BLOOD PRESSURE: 120 MMHG

## 2021-12-15 DIAGNOSIS — D75.89 MACROCYTOSIS WITHOUT ANEMIA: ICD-10-CM

## 2021-12-15 DIAGNOSIS — D68.59 PROTEIN C DEFICIENCY (HCC): ICD-10-CM

## 2021-12-15 DIAGNOSIS — G40.909 SEIZURE DISORDER (HCC): ICD-10-CM

## 2021-12-15 DIAGNOSIS — F32.A ANXIETY AND DEPRESSION: ICD-10-CM

## 2021-12-15 DIAGNOSIS — R63.4 WEIGHT LOSS: ICD-10-CM

## 2021-12-15 DIAGNOSIS — F41.9 ANXIETY AND DEPRESSION: ICD-10-CM

## 2021-12-15 DIAGNOSIS — I82.629 DEEP VEIN THROMBOSIS (DVT) OF OTHER VEIN OF UPPER EXTREMITY, UNSPECIFIED CHRONICITY, UNSPECIFIED LATERALITY (HCC): ICD-10-CM

## 2021-12-15 DIAGNOSIS — R63.0 LOSS OF APPETITE: ICD-10-CM

## 2021-12-15 PROCEDURE — 99214 OFFICE O/P EST MOD 30 MIN: CPT | Performed by: NURSE PRACTITIONER

## 2021-12-15 RX ORDER — MIRTAZAPINE 30 MG/1
30 TABLET, FILM COATED ORAL
Qty: 30 TABLET | Refills: 5 | Status: SHIPPED | OUTPATIENT
Start: 2021-12-15

## 2021-12-15 RX ORDER — APIXABAN 2.5 MG/1
2.5 TABLET, FILM COATED ORAL 2 TIMES DAILY
Qty: 60 TABLET | Refills: 5 | Status: SHIPPED | OUTPATIENT
Start: 2021-12-15

## 2021-12-15 ASSESSMENT — FIBROSIS 4 INDEX: FIB4 SCORE: 1.19

## 2021-12-15 NOTE — ASSESSMENT & PLAN NOTE
Weight loss and loss of appetite since being started on Zonegran for her seizure disorder. She initially felt some benefit with mirtazapine but feels that this has become less effective over time. She is not gaining weight and would like to put on 5 to 10 pounds. Weight has stabilized, 108 in clinic today

## 2021-12-15 NOTE — PROGRESS NOTES
Subjective:     Chief Complaint   Patient presents with   • Lab Results     and mammogram    • Pharmacy Follow-up     Kimmy Remy is a 46 y.o. female here today to follow up on:    Seizure disorder (HCC)  Continues to be seizure-free on Zonegran, followed by Dr. Napier. This could be impacting her appetite. She plans to follow-up with her specialist as weight loss continues to be a concern    DVT (deep venous thrombosis)  Extensive clot down the neck and extending into the left arm when hospitalized with sepsis back in 2014. She was found to have protein C deficiency. She has been on Eliquis for several years and is now tolerating this well. No lower extremity swelling, pain, difficulty breathing. No bruising or bleeding    Macrocytosis without anemia  This is been an ongoing issue without correlating B12 deficiency. Stable at this time. No alcohol use    Anxiety and depression  Initially felt that she had some mood improvement with mirtazapine 15 mg daily but feels that it has become somewhat less effective with time. She is not sleeping as well, still feels stressed and anxious at times    Weight loss  Weight loss and loss of appetite since being started on Zonegran for her seizure disorder. She initially felt some benefit with mirtazapine but feels that this has become less effective over time. She is not gaining weight and would like to put on 5 to 10 pounds. Weight has stabilized, 108 in clinic today       Current medicines (including changes today)  Current Outpatient Medications   Medication Sig Dispense Refill   • mirtazapine (REMERON) 30 MG Tab tablet Take 1 Tablet by mouth at bedtime. 30 Tablet 5   • ELIQUIS 2.5 MG Tab Take 1 Tablet by mouth 2 times a day. 60 Tablet 5   • mirtazapine (REMERON) 15 MG Tab Take 1 Tablet by mouth at bedtime. 30 Tablet 2   • zonisamide (ZONEGRAN) 100 MG Cap      • ibuprofen (MOTRIN) 200 MG Tab Take 800 mg by mouth every 6 hours as needed for Mild Pain.     •  "acetaminophen (TYLENOL) 325 MG Tab Take 650 mg by mouth every four hours as needed for Mild Pain.       No current facility-administered medications for this visit.     She  has a past medical history of Anxiety and depression (9/2/2021), Back pain, Fibromyalgia, Indigestion, Infectious disease, Other specified symptom associated with female genital organs, Personal history of venous thrombosis and embolism, Renal disorder, Seizure disorder (HCC) (9/2/2021), and Unspecified hemorrhagic conditions. She also has no past medical history of Arrhythmia, Arthritis, Asthma, Bronchitis, Cancer ([facility]), Chronic airway obstruction, not elsewhere classified, Congestive heart failure ([facility]), Encounter for renal dialysis, Fall, Glaucoma, Heart murmur, Heart valve disease, Hypertension, Jaundice, Myocardial infarct ([facility]), Other and unspecified angina pectoris, Pacemaker, Pneumonia, Rheumatic fever, Stroke ([facility]), Type II or unspecified type diabetes mellitus without mention of complication, not stated as uncontrolled, Unspecified cataract, Unspecified disorder of thyroid, or Unspecified urinary incontinence.    ROS included above     Objective:     /82 (BP Location: Right arm, Patient Position: Sitting, BP Cuff Size: Adult)   Pulse 91   Temp 36.3 °C (97.3 °F) (Temporal)   Resp 18   Ht 1.676 m (5' 6\")   Wt 49.3 kg (108 lb 11 oz)   SpO2 98%  Body mass index is 17.54 kg/m².     Physical Exam:  General: Alert, oriented in no acute distress.  Eye contact is good, speech is normal, affect calm  Lungs: clear to auscultation bilaterally, normal effort, no wheeze/ rhonchi/ rales.  CV: regular rate and rhythm, S1, S2, no murmur  Ext: no edema, color normal, vascularity normal, temperature normal    Assessment and Plan:   The following treatment plan was discussed   1. Anxiety and depression   still having some anxiety and not sleeping as well recently. We will try increased dose of mirtazapine  mirtazapine (REMERON) 30 MG Tab " tablet   2. Loss of appetite   initially felt some improvement in her appetite with mirtazapine but feels that this is worn off in recent months. We will try increased dose.  mirtazapine (REMERON) 30 MG Tab tablet   3. Deep vein thrombosis (DVT) of other vein of upper extremity, unspecified chronicity, unspecified laterality (HCC)   stable  ELIQUIS 2.5 MG Tab   4. Protein C deficiency (HCC)   continue anticoagulation   5. Seizure disorder (HCC)   no recent seizures. Zonegran may be contributing to her loss of appetite and weight loss. She will follow-up with her neurologist   6. Macrocytosis without anemia   stable, no B12 deficiency, no alcohol use. Continue to monitor   7. Weight loss   as discussed above       Followup: 4 months, sooner as needed         Please note that this dictation was created using voice recognition software. I have worked with consultants from the vendor as well as technical experts from [employer] [facility] [employer] to optimize the interface. I have made every reasonable attempt to correct obvious errors, but I expect that there are errors of grammar and possibly content that I did not discover before finalizing the note.

## 2021-12-15 NOTE — ASSESSMENT & PLAN NOTE
Extensive clot down the neck and extending into the left arm when hospitalized with sepsis back in 2014. She was found to have protein C deficiency. She has been on Eliquis for several years and is now tolerating this well. No lower extremity swelling, pain, difficulty breathing. No bruising or bleeding

## 2021-12-15 NOTE — ASSESSMENT & PLAN NOTE
This is been an ongoing issue without correlating B12 deficiency. Stable at this time. No alcohol use

## 2021-12-15 NOTE — ASSESSMENT & PLAN NOTE
Continues to be seizure-free on Zonegran, followed by Dr. Napier. This could be impacting her appetite. She plans to follow-up with her specialist as weight loss continues to be a concern

## 2021-12-15 NOTE — ASSESSMENT & PLAN NOTE
Initially felt that she had some mood improvement with mirtazapine 15 mg daily but feels that it has become somewhat less effective with time. She is not sleeping as well, still feels stressed and anxious at times